# Patient Record
(demographics unavailable — no encounter records)

---

## 2024-10-26 NOTE — HISTORY OF PRESENT ILLNESS
[Patient reported PAP Smear was normal] : Patient reported PAP Smear was normal [N] : Patient does not use contraception [Y] : Patient is sexually active [Menarche Age: ____] : age at menarche was [unfilled] [Currently Active] : currently active [Men] : men [No] : No [Mammogramdate] : 02/05/2020 [TextBox_19] : francie bilateral RT Breast BR1     LT Breast: br2 [BreastSonogramDate] : 03/11/2021 [TextBox_25] : complete bilateral  br 1  [PapSmeardate] : 10/21/2023 [ColonoscopyDate] : n/a [TextBox_43] : pt states still need to make appointment   [GonorrheaDate] : n/a [ChlamydiaDate] : n/a [HPVDate] : 10/21/2023 [TextBox_78] : neg  [LMPDate] : 10/21/2024 [PGHxTotal] : 2 [[facility] [[facility] : 2 [FreeTextEntry1] : 10/21/2024